# Patient Record
Sex: MALE | Race: WHITE | NOT HISPANIC OR LATINO | Employment: STUDENT | URBAN - METROPOLITAN AREA
[De-identification: names, ages, dates, MRNs, and addresses within clinical notes are randomized per-mention and may not be internally consistent; named-entity substitution may affect disease eponyms.]

---

## 2017-05-15 ENCOUNTER — HOSPITAL ENCOUNTER (EMERGENCY)
Facility: OTHER | Age: 22
Discharge: HOME | End: 2017-05-15
Attending: EMERGENCY MEDICINE
Payer: MEDICAID

## 2017-05-15 VITALS
TEMPERATURE: 97 F | HEIGHT: 71 IN | BODY MASS INDEX: 25.34 KG/M2 | DIASTOLIC BLOOD PRESSURE: 84 MMHG | SYSTOLIC BLOOD PRESSURE: 125 MMHG | OXYGEN SATURATION: 100 % | WEIGHT: 181 LBS | HEART RATE: 79 BPM | RESPIRATION RATE: 18 BRPM

## 2017-05-15 DIAGNOSIS — J06.9 UPPER RESPIRATORY TRACT INFECTION, UNSPECIFIED TYPE: Primary | ICD-10-CM

## 2017-05-15 LAB — POC RAPID STREP A: NEGATIVE

## 2017-05-15 PROCEDURE — 96372 THER/PROPH/DIAG INJ SC/IM: CPT

## 2017-05-15 PROCEDURE — 99283 EMERGENCY DEPT VISIT LOW MDM: CPT

## 2017-05-15 PROCEDURE — 25000003 PHARM REV CODE 250: Performed by: EMERGENCY MEDICINE

## 2017-05-15 PROCEDURE — 63600175 PHARM REV CODE 636 W HCPCS: Performed by: EMERGENCY MEDICINE

## 2017-05-15 RX ORDER — DEXAMETHASONE SODIUM PHOSPHATE 4 MG/ML
4 INJECTION, SOLUTION INTRA-ARTICULAR; INTRALESIONAL; INTRAMUSCULAR; INTRAVENOUS; SOFT TISSUE
Status: COMPLETED | OUTPATIENT
Start: 2017-05-15 | End: 2017-05-15

## 2017-05-15 RX ORDER — IBUPROFEN 800 MG/1
800 TABLET ORAL EVERY 6 HOURS PRN
Qty: 20 TABLET | Refills: 0 | Status: SHIPPED | OUTPATIENT
Start: 2017-05-15 | End: 2020-08-15

## 2017-05-15 RX ORDER — IBUPROFEN 400 MG/1
800 TABLET ORAL
Status: COMPLETED | OUTPATIENT
Start: 2017-05-15 | End: 2017-05-15

## 2017-05-15 RX ADMIN — DEXAMETHASONE SODIUM PHOSPHATE 4 MG: 4 INJECTION, SOLUTION INTRAMUSCULAR; INTRAVENOUS at 09:05

## 2017-05-15 RX ADMIN — IBUPROFEN 800 MG: 400 TABLET, FILM COATED ORAL at 09:05

## 2017-05-15 NOTE — DISCHARGE INSTRUCTIONS
Viral Upper Respiratory Illness (Adult)  You have a viral upper respiratory illness (URI), which is another term for the common cold. This illness is contagious during the first few days. It is spread through the air by coughing and sneezing. It may also be spread by direct contact (touching the sick person and then touching your own eyes, nose, or mouth). Frequent handwashing will decrease risk of spread. Most viral illnesses go away within 7 to 10 days with rest and simple home remedies. Sometimes the illness may last for several weeks. Antibiotics will not kill a virus, and they are generally not prescribed for this condition.    Home care  · If symptoms are severe, rest at home for the first 2 to 3 days. When you resume activity, don't let yourself get too tired.  · Avoid being exposed to cigarette smoke (yours or others).  · You may use acetaminophen or ibuprofen to control pain and fever, unless another medicine was prescribed. (Note: If you have chronic liver or kidney disease, have ever had a stomach ulcer or gastrointestinal bleeding, or are taking blood-thinning medicines, talk with your healthcare provider before using these medicines.) Aspirin should never be given to anyone under 18 years of age who is ill with a viral infection or fever. It may cause severe liver or brain damage.  · Your appetite may be poor, so a light diet is fine. Avoid dehydration by drinking 6 to 8 glasses of fluids per day (water, soft drinks, juices, tea, or soup). Extra fluids will help loosen secretions in the nose and lungs.  · Over-the-counter cold medicines will not shorten the length of time youre sick, but they may be helpful for the following symptoms: cough, sore throat, and nasal and sinus congestion. (Note: Do not use decongestants if you have high blood pressure.)  Follow-up care  Follow up with your healthcare provider, or as advised.  When to seek medical advice  Call your healthcare provider right away if any  of these occur:  · Cough with lots of colored sputum (mucus)  · Severe headache; face, neck, or ear pain  · Difficulty swallowing due to throat pain  · Fever of 100.4°F (38°C)  Call 911, or get immediate medical care  Call emergency services right away if any of these occur:  · Chest pain, shortness of breath, wheezing, or difficulty breathing  · Coughing up blood  · Inability to swallow due to throat pain  Date Last Reviewed: 9/13/2015  © 3270-0798 [employer]. [address] [place] [address]. All rights reserved. This information is not intended as a substitute for professional medical care. Always follow your healthcare professional's instructions.

## 2017-05-15 NOTE — ED NOTES
20 y/o male presents to the ER with the cc of body aches and sore throat x 1 day. Patient denies being around anyone who marry been sick. Patient reports he just don't feel well and his back primarily hurts

## 2017-05-15 NOTE — ED AVS SNAPSHOT
[facility] EMERGENCY DEPARTMENT  [address] [facility] [address]               Hector Diaz   5/15/2017  8:33 AM   ED    Description:  Male : 1995   Department:  [facility] Emergency Department           Your Care was Coordinated By:     Provider Role From To    Lorelei Avalos MD Attending Provider 05/15/17 0903 --      Reason for Visit     Back Pain           Diagnoses this Visit        Comments    Upper respiratory tract infection, unspecified type    -  Primary       ED Disposition     None           To Do List           Follow-up Information     Follow up with Tye Quick MD.    Specialty:  General Practice    Why:  If symptoms worsen    Contact information:    [address] [facility] [address]  [phone]          Follow up with [facility] Emergency Department.    Specialty:  Emergency Medicine    Why:  As needed    Contact information:    [address] [facility] [address]  [phone]       These Medications        Disp Refills Start End    ibuprofen (ADVIL,MOTRIN) 800 MG tablet 20 tablet 0 5/15/2017     Take 1 tablet (800 mg total) by mouth every 6 (six) hours as needed for Pain. - Oral      [facility]     [facility] Nurse Care Line - 24/7 Assistance  Unless otherwise directed by your provider, please contact [facility], our nurse care line that is available for 24/7 assistance.     Registered nurses in the [facility] provide: appointment scheduling, clinical advisement, health education, and other advisory services.  Call: [phone] (toll free)               Medications           Message regarding Medications     Verify the changes and/or additions to your medication regime listed below are the same as discussed with your clinician today.  If any of these changes or additions are incorrect, please notify your healthcare provider.        START taking these NEW medications        Refills    ibuprofen  "(ADVIL,MOTRIN) 800 MG tablet 0    Sig: Take 1 tablet (800 mg total) by mouth every 6 (six) hours as needed for Pain.    Class: Print    Route: Oral      These medications were administered today        Dose Freq    ibuprofen tablet 800 mg 800 mg ED 1 Time    Sig: Take 2 tablets (800 mg total) by mouth ED 1 Time.    Class: Normal    Route: Oral    dexamethasone injection 4 mg 4 mg ED 1 Time    Sig: Inject 1 mL (4 mg total) into the muscle ED 1 Time.    Class: Normal    Route: Intramuscular           Verify that the below list of medications is an accurate representation of the medications you are currently taking.  If none reported, the list may be blank. If incorrect, please contact your healthcare provider. Carry this list with you in case of emergency.           Current Medications     dexamethasone injection 4 mg Inject 1 mL (4 mg total) into the muscle ED 1 Time.    ibuprofen (ADVIL,MOTRIN) 800 MG tablet Take 1 tablet (800 mg total) by mouth every 6 (six) hours as needed for Pain.    ondansetron (ZOFRAN-ODT) 4 MG TbDL Take 1 tablet (4 mg total) by mouth every 6 (six) hours as needed.           Clinical Reference Information           Your Vitals Were     BP Pulse Temp Resp Height Weight    125/84 79 97.4 °F (36.3 °C) (Temporal) 18 5' 11" (1.803 m) 82.1 kg (181 lb)    SpO2 BMI             100% 25.24 kg/m2         Allergies as of 5/15/2017     No Known Allergies      Immunizations Administered on Date of Encounter - 5/15/2017     None      ED Micro, Lab, POCT     Start Ordered       Status Ordering Provider    05/15/17 0927 05/15/17 0927  POCT Rapid Strep A  Once      Final result     05/15/17 0911 05/15/17 0910  POCT rapid strep A  Once      Acknowledged       ED Imaging Orders     None        Discharge Instructions         Viral Upper Respiratory Illness (Adult)  You have a viral upper respiratory illness (URI), which is another term for the common cold. This illness is contagious during the first few days. It " is spread through the air by coughing and sneezing. It may also be spread by direct contact (touching the sick person and then touching your own eyes, nose, or mouth). Frequent handwashing will decrease risk of spread. Most viral illnesses go away within 7 to 10 days with rest and simple home remedies. Sometimes the illness may last for several weeks. Antibiotics will not kill a virus, and they are generally not prescribed for this condition.    Home care  · If symptoms are severe, rest at home for the first 2 to 3 days. When you resume activity, don't let yourself get too tired.  · Avoid being exposed to cigarette smoke (yours or others).  · You may use acetaminophen or ibuprofen to control pain and fever, unless another medicine was prescribed. (Note: If you have chronic liver or kidney disease, have ever had a stomach ulcer or gastrointestinal bleeding, or are taking blood-thinning medicines, talk with your healthcare provider before using these medicines.) Aspirin should never be given to anyone under 18 years of age who is ill with a viral infection or fever. It may cause severe liver or brain damage.  · Your appetite may be poor, so a light diet is fine. Avoid dehydration by drinking 6 to 8 glasses of fluids per day (water, soft drinks, juices, tea, or soup). Extra fluids will help loosen secretions in the nose and lungs.  · Over-the-counter cold medicines will not shorten the length of time youre sick, but they may be helpful for the following symptoms: cough, sore throat, and nasal and sinus congestion. (Note: Do not use decongestants if you have high blood pressure.)  Follow-up care  Follow up with your healthcare provider, or as advised.  When to seek medical advice  Call your healthcare provider right away if any of these occur:  · Cough with lots of colored sputum (mucus)  · Severe headache; face, neck, or ear pain  · Difficulty swallowing due to throat pain  · Fever of 100.4°F (38°C)  Call 911, or get  immediate medical care  Call emergency services right away if any of these occur:  · Chest pain, shortness of breath, wheezing, or difficulty breathing  · Coughing up blood  · Inability to swallow due to throat pain  Date Last Reviewed: 9/13/2015  © 0078-9685 [employer]. [address] [place] [address]. All rights reserved. This information is not intended as a substitute for professional medical care. Always follow your healthcare professional's instructions.          [facility] Sign-Up     Activating your [facility] account is as easy as 1-2-3!     1) Visit [email or web address], select Sign Up Now, enter this activation code and your date of birth, then select Next.  [email or web address]  Expires: 6/29/2017  9:43 AM      2) Create a username and password to use when you visit [facility] in the future and select a security question in case you lose your password and select Next.    3) Enter your e-mail address and click Sign Up!    Additional Information  If you have questions, please e-mail [email or web address] or call [phone] to talk to our [facility] staff. Remember, [facility] is NOT to be used for urgent needs. For medical emergencies, dial 911.         Smoking Cessation     If you would like to quit smoking:   You may be eligible for free services if you are a [place] resident and started smoking cigarettes before September 1, 1988.  Call the Smoking Cessation [employer] ([facility]) toll free at (138) [phone] or (591) 797-1870.   Call 1-800-QUIT-NOW if you do not meet the above criteria.   Contact us via email: [email or web address]   View our website for more information: [email or web address]         [facility] Emergency Department complies with applicable Federal civil rights laws and does not discriminate on the basis of race, color, national origin, age, disability, or sex.        Language Assistance Services     ATTENTION: Language assistance services are available, free of  charge. Please call [phone].      ATENCIÓN: Si habla español, tiene a mercado disposición servicios gratuitos de asistencia lingüística. Llame al [phone].     CHÚ Ý: N?u b?n nói Ti?ng Vi?t, có các d?ch v? h? tr? ngôn ng? mi?n phí dành cho b?n. G?i s? [phone].

## 2017-05-15 NOTE — ED PROVIDER NOTES
Encounter Date: 5/15/2017       History     Chief Complaint   Patient presents with    Back Pain     non traumatic back pain with cold symptoms onset yesterday      Review of patient's allergies indicates:  No Known Allergies  The history is provided by the patient.   21 -year-old who complains of a sore throat.  Patient awoke yesterday with a sore throat. he took an unknown antibiotic one time.  His symptoms have improved.  He's also had nasal congestion, headache, nonproductive cough, back pain, chills and feeling drowsy.  He's had a decreased appetite.  He had one episode of postoperative emesis.  His urine output has been normal.  Patient also has chronic back pain but does not take any medications for this.  History reviewed. No pertinent past medical history.  History reviewed. No pertinent surgical history.  History reviewed. No pertinent family history.  Social History   Substance Use Topics    Smoking status: Light Tobacco Smoker    Smokeless tobacco: None    Alcohol use No     Review of Systems   Constitutional: Positive for activity change, appetite change, chills, diaphoresis, fatigue and fever.   HENT: Positive for congestion and sneezing.    Eyes: Negative for photophobia.   Respiratory: Positive for cough. Negative for shortness of breath.    Cardiovascular: Negative for chest pain.   Gastrointestinal: Positive for vomiting (post tussive x 1 yesterday). Negative for abdominal pain and diarrhea.   Genitourinary: Negative for decreased urine volume and dysuria.   Musculoskeletal: Positive for back pain. Negative for neck pain and neck stiffness.   Skin: Negative for rash.   Neurological: Positive for headaches.       Physical Exam   Initial Vitals   BP Pulse Resp Temp SpO2   05/15/17 0837 05/15/17 0836 05/15/17 0836 05/15/17 0836 05/15/17 0836   125/84 79 18 97.4 °F (36.3 °C) 100 %     Physical Exam    Nursing note and vitals reviewed.  Constitutional: He appears well-developed and well-nourished.    HENT:   Head: Normocephalic and atraumatic.   Mouth/Throat: Posterior oropharyngeal erythema present. No oropharyngeal exudate or posterior oropharyngeal edema.   Eyes: EOM are normal. Pupils are equal, round, and reactive to light.   Neck: Normal range of motion. Neck supple. Normal range of motion present. No rigidity.   Cardiovascular: Normal rate and regular rhythm.   Pulmonary/Chest: Breath sounds normal.   Abdominal: Soft. There is no rebound and no guarding.   Musculoskeletal: Normal range of motion.   Neurological: He is alert and oriented to person, place, and time. No cranial nerve deficit.   Skin: Skin is warm and dry.   Psychiatric: He has a normal mood and affect.         ED Course   Procedures  Labs Reviewed   POCT RAPID STREP A             Medical Decision Making:   Initial Assessment:   21-year-old presents with URI type symptoms.  He is afebrile and nontoxic appearing.  ED Management:  Strep test was negative.  Patient was treated here in the ED with Decadron as well as ibuprofen.  He'll be discharged on ibuprofen.  He does not require antibiotics at this time                   ED Course     Clinical Impression:   The encounter diagnosis was Upper respiratory tract infection, unspecified type.          Lorelei Avalos MD  05/15/17 6470

## 2019-06-28 ENCOUNTER — HOSPITAL ENCOUNTER (EMERGENCY)
Facility: HOSPITAL | Age: 24
Discharge: HOME | End: 2019-06-28
Attending: INTERNAL MEDICINE

## 2019-06-28 VITALS
TEMPERATURE: 99 F | HEART RATE: 61 BPM | HEIGHT: 70 IN | SYSTOLIC BLOOD PRESSURE: 131 MMHG | BODY MASS INDEX: 25.77 KG/M2 | RESPIRATION RATE: 18 BRPM | OXYGEN SATURATION: 98 % | DIASTOLIC BLOOD PRESSURE: 76 MMHG | WEIGHT: 180 LBS

## 2019-06-28 DIAGNOSIS — S60.10XA SUBUNGUAL HEMATOMA OF FINGER, INITIAL ENCOUNTER: Primary | ICD-10-CM

## 2019-06-28 DIAGNOSIS — T14.90XA BLUNT TRAUMA: ICD-10-CM

## 2019-06-28 PROBLEM — T14.8XXA CRUSH INJURY: Status: ACTIVE | Noted: 2019-06-28

## 2019-06-28 PROCEDURE — 99283 EMERGENCY DEPT VISIT LOW MDM: CPT | Mod: 25,ER

## 2019-06-28 PROCEDURE — 11740 EVACUATION SUBUNGUAL HMTMA: CPT | Mod: F5,ER

## 2019-06-29 NOTE — ED PROVIDER NOTES
Encounter Date: 6/28/2019    SCRIBE #1 NOTE: I, Ida Cast, am scribing for, and in the presence of,  Maggy Vallecillo NP. I have scribed the following portions of the note - Other sections scribed: HPI, ROS, PE .       History     Chief Complaint   Patient presents with    Hand Injury     Patient smashed right thumb in car door about 3 pm     23 y.o male presents to the ED with right thumb pain after he smashed it in a car door earlier today. He notes some swelling, but has full ROM. He describes the pain as stiff. He has not taken anything for pain.     The history is provided by the patient. No  was used.     Review of patient's allergies indicates:  No Known Allergies  History reviewed. No pertinent past medical history.  History reviewed. No pertinent surgical history.  History reviewed. No pertinent family history.  Social History     Tobacco Use    Smoking status: Heavy Tobacco Smoker     Packs/day: 2.00     Types: Cigarettes    Smokeless tobacco: Never Used   Substance Use Topics    Alcohol use: Yes    Drug use: No     Review of Systems   Constitutional: Negative for fever.   HENT: Negative for sore throat.    Respiratory: Negative for shortness of breath.    Cardiovascular: Negative for chest pain.   Gastrointestinal: Negative for nausea.   Genitourinary: Negative for dysuria.   Musculoskeletal: Positive for arthralgias (right thumb pain ) and joint swelling. Negative for back pain.   Skin: Negative for rash.   Neurological: Negative for weakness.   Hematological: Does not bruise/bleed easily.   All other systems reviewed and are negative.    Physical Exam     Initial Vitals [06/28/19 1849]   BP Pulse Resp Temp SpO2   138/80 (!) 56 20 98.9 °F (37.2 °C) 100 %      MAP       --         Physical Exam    Nursing note and vitals reviewed.  Constitutional: He appears well-developed and well-nourished. He is not diaphoretic. No distress.   HENT:   Head: Normocephalic and atraumatic.    Eyes: Conjunctivae and EOM are normal. Pupils are equal, round, and reactive to light.   Cardiovascular: Normal rate, regular rhythm, normal heart sounds and intact distal pulses. Exam reveals no gallop and no friction rub.    No murmur heard.  Pulmonary/Chest: Breath sounds normal. No respiratory distress. He has no wheezes. He has no rhonchi. He has no rales.   Musculoskeletal: Normal range of motion. He exhibits no tenderness.        Right hand: Right thumb: Exhibits swelling (Subungual hematoma).   Neurological: He is alert and oriented to person, place, and time. No cranial nerve deficit or sensory deficit.   Skin: Skin is warm and dry. Capillary refill takes less than 2 seconds.   Psychiatric: He has a normal mood and affect. His behavior is normal.       ED Course   Procedures  Labs Reviewed - No data to display       Imaging Results          X-Ray Finger 2 or More Views Right (Final result)  Result time 06/28/19 19:39:11    Final result by Silvano Ramírez MD (06/28/19 19:39:11)                 Impression:      1. No acute displaced fracture or dislocation of the thumb noting nonspecific edema.      Electronically signed by: Silvano Ramírez MD  Date:    06/28/2019  Time:    19:39             Narrative:    EXAMINATION:  XR FINGER 2 OR MORE VIEWS RIGHT    CLINICAL HISTORY:  slammed thumb in her door;    COMPARISON:  None    FINDINGS:  Three views right thumb.    No acute displaced fracture or dislocation of the thumb.  No radiopaque foreign body.  There is edema about the thumb.                                 Medical Decision Making:   Initial Assessment:   23 y.o male presents to the ED with right thumb pain after he smashed it in a car door earlier today. He notes some swelling, but has full ROM. He describes the pain as stiff. He has not taken anything for pain.     Differential Diagnosis:   Subungual hematoma, tuft fracture, crush injury  Clinical Tests:   Radiological Study: Ordered and Reviewed  ED  Management:  Patient examined and noted to have a subungual hematoma to the right thumb.  X-ray negative for fracture.  Patient advised to take over-the-counter ibuprofen as needed for pain. A small hole was burning to the nail to help evacuate the blood underneath the nail but was unsuccessful.  Patient given strict return precautions and voiced understanding of all discharge instructions.  Patient stable at discharge.            Scribe Attestation:   Scribe #1: I performed the above scribed service and the documentation accurately describes the services I performed. I attest to the accuracy of the note.    This document was produced by a scribe under my direction and in my presence. I agree with the content of the note and have made any necessary edits.     Maggy Vallecillo DNP, ROGER-BC          ED Course as of Jun 28 1956   Fri Jun 28, 2019 1948 BP: 138/80 [AT]   1949 Temp: 98.9 °F (37.2 °C) [AT]   1949 Temp src: Oral [AT]   1949 Pulse(!): 56 [AT]   1949 Resp: 20 [AT]   1949 SpO2: 100 % [AT]      ED Course User Index  [AT] ROGER Stringer     Clinical Impression:     1. Subungual hematoma of finger, initial encounter    2. Blunt trauma                                   ROGER Stringer  06/28/19 1957

## 2019-06-29 NOTE — ED TRIAGE NOTES
Pt presents to ER with c/o rt thumb pain after slamming it in a car door earlier today.  Has full ROM and a small hematoma under the fingernail.

## 2019-10-09 ENCOUNTER — HOSPITAL ENCOUNTER (EMERGENCY)
Facility: HOSPITAL | Age: 24
Discharge: HOME | End: 2019-10-09
Attending: EMERGENCY MEDICINE
Payer: MEDICAID

## 2019-10-09 VITALS
BODY MASS INDEX: 25.83 KG/M2 | TEMPERATURE: 98 F | WEIGHT: 180 LBS | SYSTOLIC BLOOD PRESSURE: 135 MMHG | RESPIRATION RATE: 16 BRPM | OXYGEN SATURATION: 99 % | DIASTOLIC BLOOD PRESSURE: 65 MMHG | HEART RATE: 69 BPM

## 2019-10-09 DIAGNOSIS — S00.03XA CONTUSION OF SCALP, INITIAL ENCOUNTER: ICD-10-CM

## 2019-10-09 DIAGNOSIS — S01.01XA LACERATION OF SCALP, INITIAL ENCOUNTER: Primary | ICD-10-CM

## 2019-10-09 PROCEDURE — 12001 RPR S/N/AX/GEN/TRNK 2.5CM/<: CPT | Mod: ER

## 2019-10-09 PROCEDURE — 99283 EMERGENCY DEPT VISIT LOW MDM: CPT | Mod: 25,ER

## 2019-10-09 PROCEDURE — 25000003 PHARM REV CODE 250: Mod: ER | Performed by: NURSE PRACTITIONER

## 2019-10-09 RX ORDER — DEXTROMETHORPHAN HYDROBROMIDE, GUAIFENESIN 5; 100 MG/5ML; MG/5ML
650 LIQUID ORAL EVERY 8 HOURS
Qty: 20 TABLET | Refills: 0 | Status: SHIPPED | OUTPATIENT
Start: 2019-10-09 | End: 2020-08-15

## 2019-10-09 RX ORDER — ACETAMINOPHEN 325 MG/1
650 TABLET ORAL ONCE
Status: COMPLETED | OUTPATIENT
Start: 2019-10-09 | End: 2019-10-09

## 2019-10-09 RX ADMIN — ACETAMINOPHEN 650 MG: 325 TABLET, FILM COATED ORAL at 08:10

## 2019-10-10 NOTE — ED PROVIDER NOTES
"Encounter Date: 10/9/2019    SCRIBE #1 NOTE: I, Yeni Dwyer, am scribing for, and in the presence of,  ROGER Florian. I have scribed the following portions of the note - Other sections scribed: HPI, ROS, PE.       History     Chief Complaint   Patient presents with    Laceration     Pt states," I cut my head today at my house.I was drinking a little."     Hector Diaz is a 23 y.o. male who presents to the ED complaining of cut to right posterior scalp on coffee table.  Patient denies LOC.  No complaints dizziness,  blurred vision, nausea or vomiting.      The history is provided by the patient. No  was used.   Laceration    The incident occurred 2 to 3 hours ago. The laceration is located on the scalp. Size: 0.5 cm  Injury mechanism: table  His tetanus status is unknown (Refused Tetanus).     Review of patient's allergies indicates:  No Known Allergies  History reviewed. No pertinent past medical history.  History reviewed. No pertinent surgical history.  History reviewed. No pertinent family history.  Social History     Tobacco Use    Smoking status: Heavy Tobacco Smoker     Packs/day: 2.00     Types: Cigarettes    Smokeless tobacco: Never Used   Substance Use Topics    Alcohol use: Yes    Drug use: No     Review of Systems   Constitutional: Negative.    HENT: Negative.    Eyes: Negative.    Respiratory: Negative.  Negative for shortness of breath.    Cardiovascular: Negative.  Negative for chest pain.   Gastrointestinal: Negative.    Endocrine: Negative.    Genitourinary: Negative.    Musculoskeletal: Negative.    Skin: Positive for wound.        Head laceration    Allergic/Immunologic: Negative.    Neurological: Negative.    Hematological: Negative.    Psychiatric/Behavioral: Negative.    All other systems reviewed and are negative.      Physical Exam     Initial Vitals [10/09/19 1843]   BP Pulse Resp Temp SpO2   (!) 149/79 (!) 114 16 98 °F (36.7 °C) 99 %      MAP       --     "     Physical Exam    Nursing note and vitals reviewed.  Constitutional: He appears well-developed.   HENT:   Head:       Right Ear: External ear normal.   Left Ear: External ear normal.   Mouth/Throat: Oropharynx is clear and moist.   Eyes: Conjunctivae are normal.   Neck: Normal range of motion. Neck supple.   Cardiovascular: Normal rate, regular rhythm, normal heart sounds and intact distal pulses.   No murmur heard.  Pulmonary/Chest: Effort normal and breath sounds normal. No respiratory distress.   Abdominal: Soft.   Musculoskeletal: Normal range of motion. He exhibits no edema or tenderness.   Neurological: He is alert and oriented to person, place, and time.   CN II- XII intact    Skin: Skin is warm and dry.   Psychiatric: He has a normal mood and affect. His behavior is normal.         ED Course   Lac Repair  Date/Time: 10/9/2019 8:39 PM  Performed by: ROGER Rawls  Authorized by: Minh Howard MD   Body area: head/neck  Laceration length: 0.5 cm  Tendon involvement: none  Nerve involvement: none  Irrigation solution: saline (Hibiclens)  Skin closure: staples  Number of sutures: 2  Technique: simple  Patient tolerance: Patient tolerated the procedure well with no immediate complications        Labs Reviewed - No data to display       Imaging Results    None          Medical Decision Making:   History:   Old Medical Records: I decided to obtain old medical records.  Initial Assessment:   Hector Diaz is a 23 y.o. male who presents to the ED complaining of cut to rigth side of his head on coffee table.  Patient denies LOC.  No complaints dizziness,  blurred vision, nausea or vomiting. Tetanus status unknown. Pt refused Tetanus.       Differential Diagnosis:   Scalp laceration, head contusion   ED Management:  Wound care performed.   Laceration repair performed.   Medicated with Tylenol.   Pt instructed on daily wound care.   Follow-up with PCP in 7-10 days for staple removal.             Scribe  Attestation:   Scribe #1: I performed the above scribed service and the documentation accurately describes the services I performed. I attest to the accuracy of the note.    This document was produced by a scribe under my direction and in my presence. I agree with the content of the note and have made any necessary edits.     ROGER Florian    10/10/2019 9:24 AM           Clinical Impression:     1. Laceration of scalp, initial encounter    2. Contusion of scalp, initial encounter                                   ROGER Rawls  10/10/19 0928

## 2019-10-18 ENCOUNTER — HOSPITAL ENCOUNTER (EMERGENCY)
Facility: HOSPITAL | Age: 24
Discharge: HOME | End: 2019-10-18
Attending: EMERGENCY MEDICINE
Payer: MEDICAID

## 2019-10-18 VITALS
OXYGEN SATURATION: 100 % | HEIGHT: 71 IN | RESPIRATION RATE: 20 BRPM | HEART RATE: 65 BPM | BODY MASS INDEX: 26.88 KG/M2 | TEMPERATURE: 99 F | WEIGHT: 192 LBS | SYSTOLIC BLOOD PRESSURE: 138 MMHG | DIASTOLIC BLOOD PRESSURE: 76 MMHG

## 2019-10-18 DIAGNOSIS — Z48.02: Primary | ICD-10-CM

## 2019-10-18 PROCEDURE — 99281 EMR DPT VST MAYX REQ PHY/QHP: CPT | Mod: ER

## 2019-10-18 NOTE — ED PROVIDER NOTES
Encounter Date: 10/18/2019       History     Chief Complaint   Patient presents with    Suture / Staple Removal     Patient had 2 staples placed on October 9   patient here for removal     23-year-old male presents to the ED for staple removal.  He sustained a laceration to the parietal scalp 8 days ago after hitting the corner of a table.  He has had no complications, headache, vomiting, vision changes, or wound drainage.        Review of patient's allergies indicates:  No Known Allergies  History reviewed. No pertinent past medical history.  History reviewed. No pertinent surgical history.  History reviewed. No pertinent family history.  Social History     Tobacco Use    Smoking status: Heavy Tobacco Smoker     Packs/day: 2.00     Types: Cigarettes    Smokeless tobacco: Never Used   Substance Use Topics    Alcohol use: Yes    Drug use: No     Review of Systems   Constitutional: Negative for fever.   Gastrointestinal: Negative for nausea.   Musculoskeletal: Negative for back pain and neck pain.   Skin: Positive for wound. Negative for rash.   Neurological: Negative for weakness and headaches.   Hematological: Does not bruise/bleed easily.       Physical Exam     Initial Vitals [10/18/19 0958]   BP Pulse Resp Temp SpO2   138/76 65 20 98.6 °F (37 °C) 100 %      MAP       --         Physical Exam    Vitals reviewed.  Constitutional: He appears well-developed and well-nourished. He is not diaphoretic. No distress.   HENT:   Head: Normocephalic and atraumatic.   Right Ear: External ear normal.   Left Ear: External ear normal.   Nose: Nose normal.   Eyes: Conjunctivae are normal. No scleral icterus.   Neck: Normal range of motion. Neck supple.   Cardiovascular: Normal rate and regular rhythm.   Pulmonary/Chest: No respiratory distress.   Abdominal: Soft.   Musculoskeletal: Normal range of motion.   Neurological: He is alert and oriented to person, place, and time.   Skin: Skin is warm and dry. No rash noted. No  erythema.   Healing 2 cm laceration to the right parietal scalp with to staples in place.         ED Course   Suture Removal  Date/Time: 10/18/2019 10:05 AM  Performed by: Bernard Lal PA-C  Authorized by: Whit Osborne MD   Body area: head/neck  Location details: scalp  Wound Appearance: clean, well healed, normal color, nontender and no drainage  Staples Removed: 2  Post-removal: no dressing applied  Facility: sutures placed in this facility  Complications: No  Specimens: No  Implants: No  Patient tolerance: Patient tolerated the procedure well with no immediate complications        Labs Reviewed - No data to display       Imaging Results    None          Medical Decision Making:   ED Management:  23-year-old male with 2 staples and scalp laceration here for removal.  No evidence of wound dehiscence, infection, or other complication.  Two staples removed without complication.  Patient discharged with wound care instructions.                      Clinical Impression:       ICD-10-CM ICD-9-CM   1. Encounter for removal of staples Z48.02 V58.32                                Bernard Lal PA-C  10/18/19 1012

## 2020-01-16 ENCOUNTER — HOSPITAL ENCOUNTER (EMERGENCY)
Facility: HOSPITAL | Age: 25
Discharge: HOME | End: 2020-01-16
Attending: INTERNAL MEDICINE
Payer: MEDICAID

## 2020-01-16 VITALS
TEMPERATURE: 99 F | SYSTOLIC BLOOD PRESSURE: 143 MMHG | WEIGHT: 200 LBS | OXYGEN SATURATION: 99 % | BODY MASS INDEX: 27.89 KG/M2 | HEART RATE: 60 BPM | DIASTOLIC BLOOD PRESSURE: 81 MMHG | RESPIRATION RATE: 16 BRPM

## 2020-01-16 DIAGNOSIS — A08.4 VIRAL GASTROENTERITIS: Primary | ICD-10-CM

## 2020-01-16 PROCEDURE — 25000003 PHARM REV CODE 250: Mod: ER | Performed by: INTERNAL MEDICINE

## 2020-01-16 PROCEDURE — 99283 EMERGENCY DEPT VISIT LOW MDM: CPT | Mod: ER

## 2020-01-16 RX ORDER — ONDANSETRON 4 MG/1
4 TABLET, ORALLY DISINTEGRATING ORAL
Status: COMPLETED | OUTPATIENT
Start: 2020-01-16 | End: 2020-01-16

## 2020-01-16 RX ORDER — ONDANSETRON 4 MG/1
4 TABLET, ORALLY DISINTEGRATING ORAL EVERY 12 HOURS PRN
Qty: 10 TABLET | Refills: 0 | Status: SHIPPED | OUTPATIENT
Start: 2020-01-16 | End: 2020-08-15

## 2020-01-16 RX ADMIN — ONDANSETRON 4 MG: 4 TABLET, ORALLY DISINTEGRATING ORAL at 08:01

## 2020-01-16 NOTE — ED PROVIDER NOTES
"Encounter Date: 1/16/2020    SCRIBE #1 NOTE: I, Isma Garcia, am scribing for, and in the presence of,  Dr. Collier. I have scribed the following portions of the note - Other sections scribed: HPI, ROS, PE.       History     Chief Complaint   Patient presents with    URI     Pt states," Cough, congestion for a few days. Sweats last night. This morning diarrhea."     Hector Diaz is a 24 y.o. male who presents to the ED complaining of cough and congestion for several days and headache, intermittent abdominal pain, with some nausea and 3 episodes of diarrhea that started last night.    The history is provided by the patient. No  was used.     Review of patient's allergies indicates:  No Known Allergies  History reviewed. No pertinent past medical history.  History reviewed. No pertinent surgical history.  History reviewed. No pertinent family history.  Social History     Tobacco Use    Smoking status: Heavy Tobacco Smoker     Packs/day: 2.00     Types: Cigarettes    Smokeless tobacco: Never Used   Substance Use Topics    Alcohol use: Yes    Drug use: No     Review of Systems   Constitutional: Positive for diaphoresis (sweats). Negative for fever.   HENT: Positive for congestion. Negative for sore throat.    Respiratory: Positive for cough. Negative for shortness of breath.    Cardiovascular: Negative for chest pain.   Gastrointestinal: Positive for abdominal pain, diarrhea and nausea. Negative for vomiting.   Genitourinary: Negative for dysuria.   Musculoskeletal: Negative for back pain.   Skin: Negative for rash.   Neurological: Positive for headaches. Negative for weakness.   Psychiatric/Behavioral: Negative for behavioral problems.   All other systems reviewed and are negative.      Physical Exam     Initial Vitals [01/16/20 0811]   BP Pulse Resp Temp SpO2   (!) 143/81 60 16 98.5 °F (36.9 °C) 99 %      MAP       --         Physical Exam    Nursing note and vitals reviewed.  Constitutional: " He appears well-developed and well-nourished.   HENT:   Head: Normocephalic and atraumatic.   Eyes: Conjunctivae are normal.   Neck: Normal range of motion. Neck supple.   Cardiovascular: Normal rate, regular rhythm and normal heart sounds. Exam reveals no gallop and no friction rub.    No murmur heard.  Pulmonary/Chest: Breath sounds normal. No respiratory distress. He has no wheezes. He has no rhonchi. He has no rales.   Abdominal: Soft. Bowel sounds are normal. He exhibits no distension and no mass. There is no tenderness. There is no rigidity, no rebound and no guarding.   Musculoskeletal: Normal range of motion. He exhibits no edema.   Neurological: He is alert and oriented to person, place, and time. GCS score is 15. GCS eye subscore is 4. GCS verbal subscore is 5. GCS motor subscore is 6.   Skin: Skin is warm and dry.   Psychiatric: He has a normal mood and affect.         ED Course   Procedures  Labs Reviewed - No data to display       Imaging Results    None          Medical Decision Making:   History:   Old Medical Records: I decided to obtain old medical records.  Initial Assessment:   Hector Diaz is a 24 y.o. male who presents to the ED complaining of cough and congestion for several days and headache, intermittent abdominal pain, with some nausea and 3 episodes of diarrhea that started last night.  ED Management:  Patient was given instructions for viral gastroenteritis and received Zofran in the emergency department.  A prescription for Zofran was given and the patient was advised to follow up with his primary care physician within the next week for re-evaluation/return to the emergency department condition worsens.            Scribe Attestation:   Scribe #1: I performed the above scribed service and the documentation accurately describes the services I performed. I attest to the accuracy of the note.    This document was produced by a scribe under my direction and in my presence. I agree with the  content of the note and have made any necessary edits.     Dr. Collier    01/16/2020 9:12 AM                          Clinical Impression:     1. Viral gastroenteritis            Disposition:   Disposition: Discharged  Condition: Stable                     Henry Collier MD  01/16/20 0913

## 2020-08-15 ENCOUNTER — HOSPITAL ENCOUNTER (EMERGENCY)
Facility: HOSPITAL | Age: 25
Discharge: HOME | End: 2020-08-15
Attending: EMERGENCY MEDICINE
Payer: OTHER GOVERNMENT

## 2020-08-15 VITALS
TEMPERATURE: 99 F | DIASTOLIC BLOOD PRESSURE: 73 MMHG | HEART RATE: 93 BPM | BODY MASS INDEX: 26.18 KG/M2 | HEIGHT: 71 IN | WEIGHT: 187 LBS | OXYGEN SATURATION: 99 % | RESPIRATION RATE: 16 BRPM | SYSTOLIC BLOOD PRESSURE: 125 MMHG

## 2020-08-15 DIAGNOSIS — R51.9 NONINTRACTABLE EPISODIC HEADACHE, UNSPECIFIED HEADACHE TYPE: Primary | ICD-10-CM

## 2020-08-15 DIAGNOSIS — M62.838 TRAPEZIUS MUSCLE SPASM: ICD-10-CM

## 2020-08-15 LAB — POCT GLUCOSE: 70 MG/DL (ref 70–110)

## 2020-08-15 PROCEDURE — U0003 INFECTIOUS AGENT DETECTION BY NUCLEIC ACID (DNA OR RNA); SEVERE ACUTE RESPIRATORY SYNDROME CORONAVIRUS 2 (SARS-COV-2) (CORONAVIRUS DISEASE [COVID-19]), AMPLIFIED PROBE TECHNIQUE, MAKING USE OF HIGH THROUGHPUT TECHNOLOGIES AS DESCRIBED BY CMS-2020-01-R: HCPCS

## 2020-08-15 PROCEDURE — 25000003 PHARM REV CODE 250: Mod: ER | Performed by: EMERGENCY MEDICINE

## 2020-08-15 PROCEDURE — 82962 GLUCOSE BLOOD TEST: CPT | Mod: ER

## 2020-08-15 PROCEDURE — 99283 EMERGENCY DEPT VISIT LOW MDM: CPT | Mod: 25,ER

## 2020-08-15 RX ORDER — BUTALBITAL, ACETAMINOPHEN AND CAFFEINE 50; 325; 40 MG/1; MG/1; MG/1
1 TABLET ORAL EVERY 6 HOURS PRN
Qty: 15 TABLET | Refills: 0 | Status: SHIPPED | OUTPATIENT
Start: 2020-08-15 | End: 2020-09-14

## 2020-08-15 RX ORDER — IBUPROFEN 600 MG/1
600 TABLET ORAL
Status: COMPLETED | OUTPATIENT
Start: 2020-08-15 | End: 2020-08-15

## 2020-08-15 RX ADMIN — IBUPROFEN 600 MG: 600 TABLET ORAL at 10:08

## 2020-08-15 NOTE — ED PROVIDER NOTES
Encounter Date: 8/15/2020    SCRIBE #1 NOTE: I, Isma Garcia, am scribing for, and in the presence of,  Dr. Torres. I have scribed the following portions of the note - Other sections scribed: HPI, ROS, PE.       History     Chief Complaint   Patient presents with    Neck Pain     Left sided neck pain radiating to Left Shoulder x 1 week, denies any known injury    Headache     Complains of VILLALBA x 1 week     Mr. Hector Diaz is a 24 y.o. male who presents to the ED complaining of intermittent frontal headache and left-sided neck pain radiating to left shoulder for 1 week. Currently headache is light, 'almost not there'.  Patient had to call off from work yesterday due to his headache. L sided neck pain does not radiate down left arm.  No posterior neck pain.  No recent infections or illnesses.  Left sided neck and shoulder pain is worse with activity. . 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 He took tylenol and goodies 3 days ago with slight relief for the headache. Patient also reports nausea and 1 episode of emesis with mild diarrhea after eating tacos yesterday at 7 PM but that has resolved.. Denies visual disturbance or abdominal pain.    The history is provided by the patient. No  was used.     Review of patient's allergies indicates:  No Known Allergies  History reviewed. No pertinent past medical history.  History reviewed. No pertinent surgical history.  History reviewed. No pertinent family history.  Social History     Tobacco Use    Smoking status: Heavy Tobacco Smoker     Packs/day: 2.00     Types: Cigarettes    Smokeless tobacco: Never Used   Substance Use Topics    Alcohol use: Yes    Drug use: No     Review of Systems   Eyes: Negative for visual disturbance.   Gastrointestinal: Negative for abdominal pain, diarrhea (resolved), nausea (resolved) and vomiting (resolved).   Musculoskeletal: Positive for myalgias (left shoulder) and neck pain  (left-sided).   Neurological: Positive for headaches (frontal).   All other systems reviewed and are negative.      Physical Exam     Initial Vitals [08/15/20 1007]   BP Pulse Resp Temp SpO2   125/73 93 16 98.9 °F (37.2 °C) 99 %      MAP       --         Physical Exam    Nursing note and vitals reviewed.  Constitutional: He appears well-developed and well-nourished. He is not diaphoretic. No distress.   Nontoxic and well-appearing.   HENT:   Head: Normocephalic and atraumatic.   Mouth/Throat: Oropharynx is clear and moist.   Eyes: Conjunctivae and EOM are normal.   Neck: Normal range of motion. Neck supple. No thyromegaly present.   Full range of motion.  No meningeal signs.   Cardiovascular: Normal rate, regular rhythm and intact distal pulses.   No murmur heard.  Pulmonary/Chest: Breath sounds normal. No stridor. No respiratory distress. He has no wheezes.   Abdominal: Soft. There is no abdominal tenderness.   Musculoskeletal: Normal range of motion.      Comments: Mild left trapezius muscle tightness.   Neurological: He is alert and oriented to person, place, and time. He has normal strength. No cranial nerve deficit. GCS score is 15. GCS eye subscore is 4. GCS verbal subscore is 5. GCS motor subscore is 6.   Skin: Skin is warm and dry.   Psychiatric: He has a normal mood and affect. His behavior is normal.         ED Course   Procedures  Labs Reviewed   SARS-COV-2 (COVID-19) QUALITATIVE PCR   POCT GLUCOSE          Imaging Results    None          Medical Decision Making:   History:   Old Medical Records: I decided to obtain old medical records.  Clinical Tests:   Lab Tests: Ordered and Reviewed  ED Management:  Mr. Diaz and I spoke extensively.  He is young healthy gentleman who does not always eat routinely or hydrate himself the way he should.  He is nontoxic and well appearing at this time in no current distress..  Discussed potential triggers for headaches.  He does not currently have a headache.  Glucose  70.  He is hungry at this time and is about to go eat lunch.  We discussed eating habits, he has not eaten anything all day yet.  We discussed that this could be a trigger for headaches.  He does have a but trapezius muscle spasm.  We discussed care for this.  He will be given a trial of education about hydration, regular meal eating, and Fioricet as needed.  We discussed worrisome signs that should prompt need to return to the ER should they occur.  There is no indication for any imaging or further blood work at this time.  He is happy with the plan.  He will give be given a work excuse for yesterday and today.            Scribe Attestation:   Scribe #1: I performed the above scribed service and the documentation accurately describes the services I performed. I attest to the accuracy of the note.                          Clinical Impression:     1. Nonintractable episodic headache, unspecified headache type    2. Trapezius muscle spasm                ED Disposition Condition    Discharge Stable        ED Prescriptions     Medication Sig Dispense Start Date End Date Auth. Provider    butalbital-acetaminophen-caffeine -40 mg (FIORICET, ESGIC) -40 mg per tablet Take 1 tablet by mouth every 6 (six) hours as needed for Headaches. 15 tablet 8/15/2020 9/14/2020 Erica Torres MD        Follow-up Information     Follow up With Specialties Details Why Contact Info    [facility]  As needed, If symptoms worsen [address] [facility] [address]  [phone]                                     Erica Torres MD  08/16/20 7132

## 2020-08-15 NOTE — Clinical Note
Hector Diaz was seen and treated in our emergency department on 8/15/2020.  He may return to work on 08/17/2020.       If you have any questions or concerns, please don't hesitate to call.      Erica Torres MD

## 2020-08-15 NOTE — DISCHARGE INSTRUCTIONS
Rest. Drink plenty of fluids. Eat and drink regularly as discussed, as inconsistent sleep, drink and eating patterns can trigger headaches. Return for any new or acute problems or concerns.

## 2020-08-17 LAB — SARS-COV-2 RNA RESP QL NAA+PROBE: NOT DETECTED

## 2021-04-10 ENCOUNTER — HOSPITAL ENCOUNTER (EMERGENCY)
Facility: HOSPITAL | Age: 26
Discharge: HOME | End: 2021-04-10
Attending: INTERNAL MEDICINE

## 2021-04-10 VITALS
SYSTOLIC BLOOD PRESSURE: 139 MMHG | HEIGHT: 71 IN | OXYGEN SATURATION: 98 % | RESPIRATION RATE: 16 BRPM | TEMPERATURE: 98 F | BODY MASS INDEX: 25.9 KG/M2 | WEIGHT: 185 LBS | DIASTOLIC BLOOD PRESSURE: 69 MMHG | HEART RATE: 59 BPM

## 2021-04-10 DIAGNOSIS — R52 PAIN AT INJECTION SITE, INITIAL ENCOUNTER: Primary | ICD-10-CM

## 2021-04-10 DIAGNOSIS — T80.89XA PAIN AT INJECTION SITE, INITIAL ENCOUNTER: Primary | ICD-10-CM

## 2021-04-10 PROCEDURE — 99283 EMERGENCY DEPT VISIT LOW MDM: CPT | Mod: ER

## 2022-10-22 ENCOUNTER — HOSPITAL ENCOUNTER (EMERGENCY)
Facility: HOSPITAL | Age: 27
Discharge: HOME | End: 2022-10-22
Attending: EMERGENCY MEDICINE

## 2022-10-22 VITALS
TEMPERATURE: 98 F | HEART RATE: 60 BPM | HEIGHT: 71 IN | RESPIRATION RATE: 98 BRPM | OXYGEN SATURATION: 98 % | DIASTOLIC BLOOD PRESSURE: 84 MMHG | WEIGHT: 178 LBS | BODY MASS INDEX: 24.92 KG/M2 | SYSTOLIC BLOOD PRESSURE: 138 MMHG

## 2022-10-22 DIAGNOSIS — R53.83 FATIGUE, UNSPECIFIED TYPE: Primary | ICD-10-CM

## 2022-10-22 LAB
CTP QC/QA: YES
INFLUENZA A ANTIGEN, POC: NEGATIVE
INFLUENZA B ANTIGEN, POC: NEGATIVE
SARS-COV-2 RDRP RESP QL NAA+PROBE: NEGATIVE

## 2022-10-22 PROCEDURE — 87635 SARS-COV-2 COVID-19 AMP PRB: CPT | Mod: ER | Performed by: EMERGENCY MEDICINE

## 2022-10-22 PROCEDURE — 87804 INFLUENZA ASSAY W/OPTIC: CPT | Mod: ER

## 2022-10-22 PROCEDURE — 99282 EMERGENCY DEPT VISIT SF MDM: CPT | Mod: ER

## 2022-10-22 NOTE — ED PROVIDER NOTES
Encounter Date: 10/22/2022    SCRIBE #1 NOTE: I, Sabrina Arteaga, am scribing for, and in the presence of,  Carol Vick MD. I have scribed the following portions of the note - Other sections scribed: HPI; ROS; PE.     History     Chief Complaint   Patient presents with    Fatigue     Pt states generalized weakness that started yesterday, denies any other symptoms at this time.     Hector Diaz is a 27 y.o. male with no known medical Hx who presents to the ED for chief complaint of fatigue and chills that began 1 day ago. Patient reports the night before his symptoms began he went out and had multiple alcoholic beverages. He did a lot of activities that day also. He states he had to leave work yesterday due to the fatigue. Patient denies associated fever, nausea, vomiting, or headache. He uses tobacco and marijuana. No further complaints at this time.       The history is provided by the patient. No  was used.   Review of patient's allergies indicates:  No Known Allergies  History reviewed. No pertinent past medical history.  History reviewed. No pertinent surgical history.  History reviewed. No pertinent family history.  Social History     Tobacco Use    Smoking status: Heavy Smoker     Packs/day: 2.00     Types: Cigarettes    Smokeless tobacco: Never   Substance Use Topics    Alcohol use: Yes    Drug use: No     Review of Systems   Constitutional:  Positive for chills and fatigue. Negative for activity change, appetite change and fever.   HENT:  Negative for congestion, rhinorrhea, sneezing and sore throat.    Respiratory:  Negative for cough, chest tightness, shortness of breath and wheezing.    Cardiovascular:  Negative for chest pain and palpitations.   Gastrointestinal:  Negative for abdominal pain, diarrhea, nausea and vomiting.   Skin:  Negative for rash.   Neurological:  Negative for dizziness, syncope, light-headedness and headaches.   All other systems reviewed and are  negative.    Physical Exam     Initial Vitals [10/22/22 0947]   BP Pulse Resp Temp SpO2   133/87 (!) 55 16 98.3 °F (36.8 °C) 98 %      MAP       --         Physical Exam    Nursing note and vitals reviewed.  Constitutional: He appears well-developed and well-nourished. No distress.   HENT:   Head: Normocephalic and atraumatic.   Eyes: Conjunctivae are normal.   Neck:   Normal range of motion.  Cardiovascular:  Normal rate and regular rhythm.           No murmur heard.  Pulmonary/Chest: Breath sounds normal. No respiratory distress.   Abdominal: Bowel sounds are normal. He exhibits no distension. There is no abdominal tenderness.   Musculoskeletal:         General: No tenderness or edema. Normal range of motion.      Cervical back: Normal range of motion.     Neurological: He is alert and oriented to person, place, and time.   Skin: Skin is warm and dry. No rash noted.   Psychiatric: He has a normal mood and affect. His behavior is normal.       ED Course   Procedures  Labs Reviewed   SARS-COV-2 RDRP GENE    Narrative:     This test utilizes isothermal nucleic acid amplification   technology to detect the SARS-CoV-2 RdRp nucleic acid segment.   The analytical sensitivity (limit of detection) is 125 genome   equivalents/mL.   A POSITIVE result implies infection with the SARS-CoV-2 virus;   the patient is presumed to be contagious.     A NEGATIVE result means that SARS-CoV-2 nucleic acids are not   present above the limit of detection. A NEGATIVE result should be   treated as presumptive. It does not rule out the possibility of   COVID-19 and should not be the sole basis for treatment decisions.   If COVID-19 is strongly suspected based on clinical and exposure   history, re-testing using an alternate molecular assay should be   considered.   This test is only for use under the Food and Drug   Administration s Emergency Use Authorization (EUA).   Commercial kits are provided by [employer].   Performance  characteristics of the EUA have been independently   verified by [facility] Department of   Pathology and Laboratory Medicine.   _________________________________________________________________   The authorized Fact Sheet for Healthcare Providers and the authorized Fact   Sheet for Patients of the ID NOW COVID-19 are available on the FDA   website:     https://www.fda.gov/media/624807/download  https://www.fda.gov/media/419878/download          POCT INFLUENZA A/B MOLECULAR   POCT RAPID INFLUENZA A/B          Imaging Results    None          Medications - No data to display  Medical Decision Making:   History:   Old Medical Records: I decided to obtain old medical records.  Clinical Tests:   Lab Tests: Ordered and Reviewed  ED Management:  Fatigue and chills - no fever, looks well. No covid or flu. Increase fluid intake.        Scribe Attestation:   Scribe #1: I performed the above scribed service and the documentation accurately describes the services I performed. I attest to the accuracy of the note.            I, Dr. Carol Vick, personally performed the services described in this documentation.   All medical record entries made by the scribe were at my direction and in my presence.   I have reviewed the chart and agree that the record is accurate and complete.   Carol Vick MD.  11:38 AM 10/22/2022          Clinical Impression:   Final diagnoses:  [R53.83] Fatigue, unspecified type (Primary)      ED Disposition Condition    Discharge Stable          ED Prescriptions    None       Follow-up Information       Follow up With Specialties Details Why Contact Info    Tye Quick MD General Practice  As needed [address] [address]  [phone]               Carol Vick MD  10/22/22 0694

## 2022-10-22 NOTE — Clinical Note
"Hector Michellecarlin Diaz was seen and treated in our emergency department on 10/22/2022.  He may return to work on 10/22/2022.       If you have any questions or concerns, please don't hesitate to call.      Carol Vick MD"

## 2024-12-30 ENCOUNTER — TELEPHONE (OUTPATIENT)
Dept: ORTHOPEDICS | Facility: CLINIC | Age: 29
End: 2024-12-30
Payer: MEDICAID

## 2024-12-31 DIAGNOSIS — R52 PAIN: Primary | ICD-10-CM

## 2025-01-06 ENCOUNTER — TELEPHONE (OUTPATIENT)
Dept: ORTHOPEDICS | Facility: CLINIC | Age: 30
End: 2025-01-06
Payer: MEDICAID

## 2025-01-06 ENCOUNTER — APPOINTMENT (OUTPATIENT)
Dept: RADIOLOGY | Facility: HOSPITAL | Age: 30
End: 2025-01-06
Payer: MEDICAID

## 2025-01-06 DIAGNOSIS — R52 PAIN: ICD-10-CM

## 2025-01-06 PROCEDURE — 73130 X-RAY EXAM OF HAND: CPT | Mod: TC,FY,PN,LT

## 2025-01-06 PROCEDURE — 73130 X-RAY EXAM OF HAND: CPT | Mod: 26,LT,, | Performed by: INTERNAL MEDICINE

## 2025-01-06 NOTE — TELEPHONE ENCOUNTER
----- Message from Med Assistant Null sent at 1/6/2025  9:05 AM CST -----  Regarding: FW: Hector  Please call him about his next appointment  ----- Message -----  From: eVrónica Pineda RN  Sent: 1/3/2025   4:09 PM CST  To: Tennille Cabrera MA; Marimar Boss PA-C  Subject: FW: Hector                                      Good afternoon ladies. Pt is a fx and was supposed to see you on Thursday but did not for some reason. Please advise.  ----- Message -----  From: Rozina Robison  Sent: 1/3/2025   3:56 PM CST  To: [facility] Staff  Subject: Hector                                            Patient is requesting a sooner appointment.  Patient declined first available appointment listed as well as another facility and provider .  Patient will not accept being placed on the waitlist and is requesting a message be sent to doctor.    Name of Caller: Hector    When is the first available appointment? 3/2025    Symptoms: Left hand pain    Would the patient rather a call back or a response via [facility]? callback    Best Call Back Number:.[phone]      Additional Information:

## 2025-01-09 ENCOUNTER — OFFICE VISIT (OUTPATIENT)
Dept: ORTHOPEDICS | Facility: CLINIC | Age: 30
End: 2025-01-09
Payer: MEDICAID

## 2025-01-09 DIAGNOSIS — S62.621A CLOSED DISPLACED FRACTURE OF MIDDLE PHALANX OF LEFT INDEX FINGER, INITIAL ENCOUNTER: Primary | ICD-10-CM

## 2025-01-09 DIAGNOSIS — S62.623A CLOSED DISPLACED FRACTURE OF MIDDLE PHALANX OF LEFT MIDDLE FINGER, INITIAL ENCOUNTER: ICD-10-CM

## 2025-01-09 PROCEDURE — 1160F RVW MEDS BY RX/DR IN RCRD: CPT | Mod: CPTII,,,

## 2025-01-09 PROCEDURE — 99999 PR PBB SHADOW E&M-EST. PATIENT-LVL II: CPT | Mod: PBBFAC,,,

## 2025-01-09 PROCEDURE — 99212 OFFICE O/P EST SF 10 MIN: CPT | Mod: PBBFAC,PN

## 2025-01-09 PROCEDURE — 1159F MED LIST DOCD IN RCRD: CPT | Mod: CPTII,,,

## 2025-01-09 PROCEDURE — 99214 OFFICE O/P EST MOD 30 MIN: CPT | Mod: S$PBB,,,

## 2025-01-09 NOTE — PROGRESS NOTES
Subjective:      Patient ID: Hector Diaz is a 29 y.o. male.    Chief Complaint: Pain of the Left Hand      HPI: Hector Diaz is a 29 y.o. left hand dominant male who presents to clinic for follow up of left index finger middle phalanx fracture and middle finger middle phalanx fracture sustained on 12/24/2024 when he fell out of a truck bed and the truck tire went over the hand. Patient went to the ED where x-rays confirmed fracture and patient was splinted. Patient has been NWB. Patient has been taking Tylenol and Norco for pain. Pain today 7/10. Reports occasional numbness and tingling. Denies any prior surgeries to hand. At baseline, patient is independent regarding ADLs.  Patient referred from primary care (Dr. Charlie Dobson) for evaluation and treatment.     Occupation:  (hands-on)    Ambulating: unassisted  Diabetic:  No  Smoking:  He currently smokes 1 packs per day.  History of DVT/PE: Negative    PAST MEDICAL HISTORY:    History reviewed. No pertinent past medical history.    PAST SURGICAL HISTORY:    History reviewed. No pertinent surgical history.    FAMILY HISTORY:    No family history on file.    SOCIAL HISTORY:    Social History     Occupational History    Not on file   Tobacco Use    Smoking status: Heavy Smoker     Current packs/day: 2.00     Types: Cigarettes    Smokeless tobacco: Never   Substance and Sexual Activity    Alcohol use: Yes    Drug use: No    Sexual activity: Not on file      MEDICATIONS:   No current outpatient medications on file.    ALLERGIES:   Review of patient's allergies indicates:  No Known Allergies    Review of Systems:  Constitution: Negative for chills, fever and night sweats.   HENT: Negative for congestion and headaches.    Eyes: Negative for blurred vision or vision loss.  Cardiovascular: Negative for chest pain and syncope.   Respiratory: Negative for cough and shortness of breath.    Endocrine: Negative for polydipsia, polyphagia and polyuria.    Hematologic/Lymphatic: Negative for bleeding problem. Does not bruise/bleed easily.   Skin: Negative for dry skin, itching and rash.   Musculoskeletal: See HPI.   Gastrointestinal: Negative for abdominal pain and bowel incontinence.   Genitourinary: Negative for bladder incontinence and nocturia.   Neurological: Negative for disturbances in coordination, loss of balance and seizures.   Psychiatric/Behavioral: Negative for depression. The patient does not have insomnia.    Allergic/Immunologic: Negative for hives and persistent infections.          Objective:      There were no vitals filed for this visit.    PHYSICAL EXAM:  General: Alert & oriented x3, well-developed and well-nourished, in no acute distress, sitting comfortably in the exam room.  Skin: Warm and dry. Capillary refill less than 2 seconds.   Head: Normocephalic and atraumatic.   Eyes: Sclera appear normal.   Nose: No deformities seen.   Ears: No deformities seen.   Neck: No tracheal deviation present.   Pulmonary/Chest: Breathing unlabored.   Neurological: Alert and oriented to person, place, and time.   Psychiatric: Mood is pleasant and affect appropriate.     LEFT HAND/WRIST:        Observation/Inspection:     See media tab for photos.         Palpation:   Tenderness to palpation to the distal 2nd and 3rd digits.        Range of Motion:  Wrist: Full to wrist flexion, extension, radial, and ulnar deviation without pain or difficulty.   Digits: Unable to flex PIP and DIP of 2nd and 3rd digits.   Unable to fully extend the DIP of the 2nd and 3rd digits.          Strength:    strength not tested today.        Neurovascular Exam:  Digits warm and well perfused, brisk capillary refill <3 seconds throughout  NVI motor/LTS to median, radial, and ulnar nerves, radial pulse 2+      Imaging:   X-Rays: 3 views of left hand dated 01/06/2025 , and independently reviewed, show: Comminuted fracture at the distal aspect of the middle phalanx of the 2nd finger,  with mild displacement, extending to the articular surface. There are also fractures of the middle phalanx of the 3rd finger, including a proximal essentially nondisplaced fracture extending to the articular surface as well as mildly angulated fracture at the distal aspect of the middle phalanx (possibly also extending into the articular surface).        Assessment:       1. Closed displaced fracture of middle phalanx of left index finger, initial encounter    2. Closed displaced fracture of middle phalanx of left middle finger, initial encounter        Plan:          I made the decision to obtain old records of the patient including previous notes and imaging. I independently reviewed and interpreted lab results today as well as prior imaging.     I explained the nature of the problem to the patient.     Discussed case and imaging with supervising physician, Dr. Abad. Findings include:  Fractures of the 2nd and 3rd middle phalanx.     I did explain to the patient he needs surgery to his hands.  Unfortunately we do not have any surgery dates available.  Encouraged patient to contact PCP for a new referral to hand surgeon.  Patient was originally seen at [facility], encouraged him to follow up there if needed.  Patient verbalized understanding.    Dr. Abad and I recommend:   Medications:  Continue  OTC Tylenol and/or NSAIDs  as needed for pain management.   Activity Modification:  Avoid use of affected limb. No lifting, gripping, pushing, or pulling with affected limb. Elevate above the heart when possible.   DME:  Continue use of aluminum splints with activities.  Encouraged patient to remove splints at home when resting to allow skin time to heal.  Pain Management: Ice compress to the affected area 2-3x a day for 15-20 minutes as needed for pain management.    Follow-Up:  with PCP and/or [facility].    All of the patient's questions were answered and the patient will contact us if they have any questions or  concerns in the interim.    Marimar Boss PA-C  [facility]  Orthopedic Surgery    Medical Dictation software was used during the dictation of portions or the entirety of this medical record.  Phonetic or grammatic errors may exist due to the use of this software. For clarification, refer to the author of the document.